# Patient Record
Sex: FEMALE | Race: WHITE
[De-identification: names, ages, dates, MRNs, and addresses within clinical notes are randomized per-mention and may not be internally consistent; named-entity substitution may affect disease eponyms.]

---

## 2019-01-24 ENCOUNTER — HOSPITAL ENCOUNTER (INPATIENT)
Dept: HOSPITAL 41 - JD.OBCHECK | Age: 24
LOS: 2 days | Discharge: HOME | DRG: 560 | End: 2019-01-26
Attending: OBSTETRICS & GYNECOLOGY | Admitting: OBSTETRICS & GYNECOLOGY
Payer: COMMERCIAL

## 2019-01-24 DIAGNOSIS — Z3A.37: ICD-10-CM

## 2019-01-24 DIAGNOSIS — E03.9: ICD-10-CM

## 2019-01-24 DIAGNOSIS — A60.00: ICD-10-CM

## 2019-01-24 DIAGNOSIS — Z23: ICD-10-CM

## 2019-01-24 PROCEDURE — 6A550ZT PHERESIS OF CORD BLOOD STEM CELLS, SINGLE: ICD-10-PCS | Performed by: OBSTETRICS & GYNECOLOGY

## 2019-01-24 PROCEDURE — 3E0R3BZ INTRODUCTION OF ANESTHETIC AGENT INTO SPINAL CANAL, PERCUTANEOUS APPROACH: ICD-10-PCS

## 2019-01-24 PROCEDURE — 00HU33Z INSERTION OF INFUSION DEVICE INTO SPINAL CANAL, PERCUTANEOUS APPROACH: ICD-10-PCS

## 2019-01-24 NOTE — PCM.PREANE
Preanesthetic Assessment





- Procedure


Proposed Procedure: 





HANS





- Anesthesia/Transfusion/Family Hx


Anesthesia History: Prior Anesthesia Without Reaction


Family History of Anesthesia Reaction: No


Transfusion History: No Prior Transfusion(s)





- Review of Systems


General: No Symptoms


Pulmonary: No Symptoms


Cardiovascular: No Symptoms


Gastrointestinal: No Symptoms


Neurological: No Symptoms


Other: Reports: None





- Physical Assessment


NPO Status Date: 01/24/19


NPO Status Time: 18:00


O2 Sat by Pulse Oximetry: 98


Respiratory Rate: 18


Vital Signs: 





 Last Vital Signs











Temp  37.6 C   01/24/19 20:11


 


Pulse  96   01/24/19 20:11


 


Resp  18   01/24/19 20:11


 


BP  127/71   01/24/19 20:11


 


Pulse Ox  98   01/24/19 20:11











Height: 1.73 m


Weight: 85.729 kg


ASA Class: 2


Mental Status: Alert & Oriented x3


Airway Class: Mallampati = 1


Dentition: Reports: Normal Dentition


Thyro-Mental Finger Breadths: 3


Mouth Opening Finger Breadths: 3


ROM/Head Extension: Full


Lungs: Clear to Auscultation, Normal Respiratory Effort


Cardiovascular: Regular Rate, Regular Rhythm





- Lab


Values: 





 Laboratory Last Values











WBC  19.85 K/mm3 (3.98-10.04)  H  01/24/19  20:25    


 


RBC  3.91 M/mm3 (3.98-5.22)  L  01/24/19  20:25    


 


Hgb  11.5 gm/L (11.2-15.7)   01/24/19  20:25    


 


Hct  35.7 % (34.1-44.9)   01/24/19  20:25    


 


MCV  91.3 fl (79.4-94.8)   01/24/19  20:25    


 


MCH  29.4 pg (25.6-32.2)   01/24/19  20:25    


 


MCHC  32.2 g/dl (32.2-35.5)   01/24/19  20:25    


 


RDW Std Deviation  41.6 fL (36.4-46.3)   01/24/19  20:25    


 


Plt Count  203 K/mm3 (182-369)   01/24/19  20:25    


 


MPV  12.6 fl (9.4-12.3)  H  01/24/19  20:25    














- Allergies


Allergies/Adverse Reactions: 


 Allergies











Allergy/AdvReac Type Severity Reaction Status Date / Time


 


No Known Allergies Allergy   Verified 01/24/19 20:37














- Blood


Blood Available: No


Product(s) Available: None





- Anesthesia Plan


Pre-Op Medication Ordered: None





- Acknowledgements


Anesthesia Type Planned: Epidural


Pt an Appropriate Candidate for the Planned Anesthesia: Yes


Alternatives and Risks of Anesthesia Discussed w Pt/Guardian: Yes


Pt/Guardian Understands and Agrees with Anesthesia Plan: Yes





PreAnesthesia Questionnaire


OB/GYN History: Reports: Pregnancy


Endocrine/Metabolic History: Reports: Hypothyroidism





- Infectious Disease History


Infectious Disease History: Reports: Herpes


Other Infectious Disease History: no active outbreak in 3 years





- Past Surgical History


HEENT Surgical History: Reports: Oral Surgery ([place] tooth extraction)





- SUBSTANCE USE


Smoking Status *Q: Never Smoker


Second Hand Smoke Exposure: No


Recreational Drug Use History: No





- HOME MEDS


Home Medications: 


 Home Meds





Acyclovir 400 mg PO TID 01/24/19 [History]


PNV95/Ferrous Fumarate/FA [Prenatal Tablet] 1 tab PO DAILY 01/24/19 [History]











- CURRENT (IN HOUSE) MEDS


Current Meds: 





 Current Medications





Diphenhydramine HCl (Benadryl)  25 mg IVPUSH Q6H PRN


   PRN Reason: Pruritis


Ephedrine Sulfate (Ephedrine Sulfate)  5 mg IVPUSH ASDIRECTED PRN


   PRN Reason: Hypotension


Fentanyl (Sublimaze)  100 mcg EPIDUR Q3H PRN


   PRN Reason: Pain


   Last Admin: 01/24/19 21:12 Dose:  100 mcg


Fentanyl/Bupivacaine HCl (Fentanyl-Bupiv-Ns 2 Mcg/Ml-0.125%)  100 ml EP 

ASDIRECTED [facility]


   Last Admin: 01/24/19 21:13 Dose:  100 ml


Lactated Ringer's (Ringers, Lactated)  1,000 mls @ 100 mls/hr IV ASDIRECTED [facility]


   Last Admin: 01/24/19 20:49 Dose:  100 mls/hr


Oxytocin/Lactated Ringer's (Pitocin In Lr 10 Units/1,000 Ml)  10 unit in 1,000 

mls @ 500 mls/hr IV .CONTINUOUS BHUPINDER


Nalbuphine HCl (Nubain)  10 mg IVPUSH Q2H PRN


   PRN Reason: pain


Ondansetron HCl (Zofran)  4 mg IVPUSH Q4H PRN


   PRN Reason: Nausea/Vomiting


Ondansetron HCl (Zofran)  4 mg IVPUSH ONETIME PRN


   PRN Reason: Nausea/Vomiting


Sodium Chloride (Saline Flush)  10 ml FLUSH ASDIRECTED PRN


   PRN Reason: Keep Vein Open

## 2019-01-24 NOTE — PCM.LDHP
L&D History of Present Illness





- General


Date of Service: 19


Admit Problem/Dx: 


 Patient Status Order with Admit Dx/Problem





19 19:59


Patient Status [ADT] Routine 








 Admission Diagnosis/Problem











Admission Diagnosis/Problem    Normal labor














Source of Information: Patient


History Limitations: Reports: No Limitations





- History of Present Illness


Introduction:: 


24 y/o  at 37 4/7 wks who presents in labor.  Contractions started today.  

They have slowly gotten closer together.  No vaginal bleeding.  No LOF








Past Medical History


OB/GYN History: Reports: Pregnancy


: 1


Para: 0


LMP (Approximate): Pregnant


Endocrine/Metabolic History: Reports: Hypothyroidism





- Infectious Disease History


Infectious Disease History: Reports: Herpes





- Past Surgical History


HEENT Surgical History: Reports: Oral Surgery ([place] tooth extraction)





Social & Family History





- Tobacco Use


Smoking Status *Q: Never Smoker





- Alcohol Use


Alcohol Use History: No





- Recreational Drug Use


Recreational Drug Use: No





H&P Review of Systems





- Review of Systems:


Review Of Systems: See Below


General: Reports: No Symptoms


Pulmonary: Reports: No Symptoms


Cardiovascular: Reports: No Symptoms


Gastrointestinal: Reports: No Symptoms


Genitourinary: Reports: No Symptoms


Musculoskeletal: Reports: No Symptoms


Psychiatric: Reports: No Symptoms


Neurological: Reports: No Symptoms





L&D Exam





- Exam


Exam: See Below





- OB Specific


Contraction Intensity: Moderate to Strong


Fetal Movement: Active


Fetal Heart Tones: Present


Fetal Heart Tones per Min: 170


Fetal Heart Rate (FHR) Variability: Moderate (6-25 bmp)


Birth Presentation: Vertex





- Leavitt Score


Leavitt Score Cervix Position: Anterior


Leavitt Score Consistency: Soft


Leavitt Score Effacement: >80%


Leavitt Score Dilation: > 5 cm


Leavitt Score Infant's Station: -1 ,0


Leavitt Score Total: 12





- Exam


General: Alert, Oriented, Cooperative


Lungs: Clear to Auscultation, Normal Respiratory Effort


Cardiovascular: Regular Rate, Regular Rhythm


GI/Abdominal Exam: Soft, Non-Tender


Genitourinary: Normal external exam


Extremities: Normal Inspection


Skin: Warm, Dry, Intact





- Problem List


(1) 37 weeks gestation of pregnancy


SNOMED Code(s): 23310456


   ICD Code: Z3A.37 - 37 WEEKS GESTATION OF PREGNANCY   Status: Acute   Current 

Visit: Yes   





(2) Normal labor


SNOMED Code(s): 26727661


   ICD Code: O80 - ENCOUNTER FOR FULL-TERM UNCOMPLICATED DELIVERY; Z37.9 - 

OUTCOME OF DELIVERY, UNSPECIFIED   Status: Acute   Current Visit: Yes   


Problem List Initiated/Reviewed/Updated: Yes


Orders Last 24hrs: 


 Active Orders 24 hr











 Category Date Time Status


 


 Patient Status [ADT] Routine ADT  19 19:59 Active


 


 Activity as Tolerated [RC] PFP Care  19 19:59 Active


 


 Communication Order [RC] ASDIRECTED Care  19 19:59 Active


 


 Fetal Heart Tones [RC] ASDIRECTED Care  19 19:59 Active


 


 Fetal Non Stress Test [RC] PER UNIT ROUTINE Care  19 19:59 Active


 


 Notify Provider [RC] PFP Care  19 19:59 Active


 


 Notify Provider [RC] PRN Care  19 19:59 Active


 


 Peripheral IV Care [RC] .AS DIRECTED Care  19 19:59 Active


 


 Vital Signs [RC] PER UNIT ROUTINE Care  19 19:59 Active


 


 Regular Diet [DIET] Diet  19 Dinner Active


 


 CBC W/O DIFF,HEMOGRAM [HEME] Stat Lab  19 19:59 Ordered


 


 RAPID PLASMA REAGIN,RPR [CHEM] Routine Lab  19 19:59 Ordered


 


 TYPE AND SCREEN [BBK] Stat Lab  19 19:59 Ordered


 


 Lactated Ringers [Ringers, Lactated] 1,000 ml Med  19 20:00 Ordered





 IV ASDIRECTED   


 


 Nalbuphine [Nubain] Med  19 19:59 Ordered





 10 mg IVPUSH Q2H PRN   


 


 Ondansetron [Zofran] Med  19 19:59 Ordered





 4 mg IVPUSH Q4H PRN   


 


 Oxytocin/Lactated Ringers [Pitocin in LR 10 Units/1,000 Med  19 20:00 

Ordered





 ML]   





 10 unit in 1,000 ml IV .CONTINUOUS   


 


 Sodium Chloride 0.9% [Saline Flush] Med  19 19:59 Ordered





 10 ml FLUSH ASDIRECTED PRN   


 


 Electronic Fetal Heart Tones Ext w TOCO [WOMSER] Oth  19 19:59 Ordered





 Routine   


 


 Electronic Fetal Heart Tones Internal [WOMSER] Per Unit Oth  19 19:59 

Ordered





 Routine   


 


 Peripheral IV Insertion Adult [OM.PC] Routine Oth  19 19:59 Ordered


 


 Resuscitation Status Routine Resus Stat  19 19:59 Ordered








 Medication Orders





Lactated Ringer's (Ringers, Lactated)  1,000 mls @ 100 mls/hr IV ASDIRECTED [facility]ER


Oxytocin/Lactated Ringer's (Pitocin In Lr 10 Units/1,000 Ml)  10 unit in 1,000 

mls @ 500 mls/hr IV .CONTINUOUS BHUPINDER


Nalbuphine HCl (Nubain)  10 mg IVPUSH Q2H PRN


   PRN Reason: pain


Ondansetron HCl (Zofran)  4 mg IVPUSH Q4H PRN


   PRN Reason: Nausea/Vomiting


Sodium Chloride (Saline Flush)  10 ml FLUSH ASDIRECTED PRN


   PRN Reason: Keep Vein Open








Assessment/Plan Comment:: 





24 y/o  at 37 4/7 wks who presents in labor





* Labs to be drawn 


* GBS negative, no need for antibiotics 


* Pain management per patient preference


* Patient denies any symptoms of HSV outbreak.  Has been on her prophylaxis 


* Anticipate

## 2019-01-24 NOTE — PCM.DEL
L & D Note





- General Info


Date of Service: 19





- Delivery Note


Labor: Spontaneous


Delivery Outcome: Livebirth


Infant Delivery Method: Spontaneous Vaginal Delivery-Single


Infant Delivery Mode: Spontaneous


Birth Presentation: Right Occiput Anterior (OPAL)


Nuchal Cord: Present (tight, not reduced)


Anesthesia Type: Epidural


Amniotic Fluid Description: Clear


Episiotomy Type: None


Laceration: None


Placenta: Intact, Spontaneous


Cord: 3 Vessels


Estimated Blood Loss: 200


: Bulb Syringe, Stimulated, Warmed, [place] Used, Warmer Used


Delivery Comments (Free Text/Narrative):: 





Patient found to be complete and began pushing.  With maternal pushing effort 

fetal head delivered from an OPAL presentation.  Tight nuchal cord present and 

so not reduced.  With gentle downward traction the fetal shoulders and body 

delivered.  Infant placed on maternal abdomen.  Cord clamped and cut.  Cord 

blood obtained. Placenta allowed time to separate and expelled intact.  

Inspection of the perineum showed no lacerations 





- General Info


Date of Service: 19





- Patient Data


Vitals - Most Recent: 


 Last Vital Signs











Temp  37.6 C   19 20:11


 


Pulse  96   19 20:11


 


Resp  18   19 21:26


 


BP  127/71   19 20:11


 


Pulse Ox  98   19 21:26











Weight - Most Recent: 85.729 kg


Lab Results Last 24 Hours: 


 Laboratory Results - last 24 hr











  19 Range/Units





  20:25 20:25 


 


WBC  19.85 H   (3.98-10.04)  K/mm3


 


RBC  3.91 L   (3.98-5.22)  M/mm3


 


Hgb  11.5   (11.2-15.7)  gm/L


 


Hct  35.7   (34.1-44.9)  %


 


MCV  91.3   (79.4-94.8)  fl


 


MCH  29.4   (25.6-32.2)  pg


 


MCHC  32.2   (32.2-35.5)  g/dl


 


RDW Std Deviation  41.6   (36.4-46.3)  fL


 


Plt Count  203   (182-369)  K/mm3


 


MPV  12.6 H   (9.4-12.3)  fl


 


Blood Type   B POSITIVE  


 


Gel Antibody Screen   Negative  











Med Orders - Current: 


 Current Medications





Diphenhydramine HCl (Benadryl)  25 mg IVPUSH Q6H PRN


   PRN Reason: Pruritis


Ephedrine Sulfate (Ephedrine Sulfate)  5 mg IVPUSH ASDIRECTED PRN


   PRN Reason: Hypotension


Fentanyl (Sublimaze)  100 mcg EPIDUR Q3H PRN


   PRN Reason: Pain


   Last Admin: 19 21:12 Dose:  100 mcg


Fentanyl/Bupivacaine HCl (Fentanyl-Bupiv-Ns 2 Mcg/Ml-0.125%)  100 ml EP 

ASDIRECTED [facility]


   Last Admin: 19 21:13 Dose:  100 ml


Lactated Ringer's (Ringers, Lactated)  1,000 mls @ 100 mls/hr IV ASDIRECTED [facility]


   Last Admin: 19 20:49 Dose:  100 mls/hr


Oxytocin/Lactated Ringer's (Pitocin In Lr 10 Units/1,000 Ml)  10 unit in 1,000 

mls @ 500 mls/hr IV .CONTINUOUS BHUPINDER


Nalbuphine HCl (Nubain)  10 mg IVPUSH Q2H PRN


   PRN Reason: pain


Ondansetron HCl (Zofran)  4 mg IVPUSH Q4H PRN


   PRN Reason: Nausea/Vomiting


Ondansetron HCl (Zofran)  4 mg IVPUSH ONETIME PRN


   PRN Reason: Nausea/Vomiting


Sodium Chloride (Saline Flush)  10 ml FLUSH ASDIRECTED PRN


   PRN Reason: Keep Vein Open











- Problem List & Annotations


(1) 37 weeks gestation of pregnancy


SNOMED Code(s): 39932002


   Code(s): Z3A.37 - 37 WEEKS GESTATION OF PREGNANCY   Status: Acute   Current 

Visit: Yes   





(2) Normal labor


SNOMED Code(s): 20959975


   Code(s): O80 - ENCOUNTER FOR FULL-TERM UNCOMPLICATED DELIVERY; Z37.9 - 

OUTCOME OF DELIVERY, UNSPECIFIED   Status: Acute   Current Visit: Yes   





(3) Vaginal delivery


SNOMED Code(s): 493592822


   Code(s): O80 - ENCOUNTER FOR FULL-TERM UNCOMPLICATED DELIVERY   Status: 

Acute   Current Visit: Yes   





- Problem List Review


Problem List Initiated/Reviewed/Updated: Yes





- My Orders


Last 24 Hours: 


My Active Orders





19 19:59


Patient Status [ADT] Routine 


Activity as Tolerated [RC] PFP 


Communication Order [RC] ASDIRECTED 


Fetal Heart Tones [RC] ASDIRECTED 


Fetal Non Stress Test [RC] PER UNIT ROUTINE 


Notify Provider [RC] PFP 


Notify Provider [RC] PRN 


Peripheral IV Care [RC] .AS DIRECTED 


Vital Signs [RC] PER UNIT ROUTINE 


RAPID PLASMA REAGIN,RPR [CHEM] Routine 


Nalbuphine [Nubain]   10 mg IVPUSH Q2H PRN 


Ondansetron [Zofran]   4 mg IVPUSH Q4H PRN 


Sodium Chloride 0.9% [Saline Flush]   10 ml FLUSH ASDIRECTED PRN 


Electronic Fetal Heart Tones Ext w TOCO [WOMSER] Routine 


Electronic Fetal Heart Tones Internal [WOMSER] Per Unit Routine 


Peripheral IV Insertion Adult [OM.PC] Routine 


Resuscitation Status Routine 





19 20:00


Lactated Ringers [Ringers, Lactated] 1,000 ml IV ASDIRECTED 


Oxytocin/Lactated Ringers [Pitocin in LR 10 Units/1,000 ML] 10 unit in 1,000 ml 

IV .CONTINUOUS 





19 22:20


PATIENT RETYPE [BBK] Routine 





19 Dinner


Regular Diet [DIET] 














- Assessment


Assessment:: 





24 y/o G1 P now  PPD#0 from  at 37 4/7 wks 








- Plan


Plan:: 








* Routine cares 


* Encourage breast feeding 


* Discharge home in 2 days

## 2019-01-25 NOTE — PCM.PNPP
- General Info


Date of Service: 19


Functional Status: Reports: Pain Controlled, Tolerating Diet, Ambulating, 

Urinating





- Review of Systems


General: Reports: No Symptoms


Pulmonary: Reports: No Symptoms


Cardiovascular: Reports: No Symptoms


Gastrointestinal: Reports: No Symptoms


Genitourinary: Reports: No Symptoms


Musculoskeletal: Reports: No Symptoms


Neurological: Reports: No Symptoms





- Patient Data


Vital Signs - Most Recent: 


 Last Vital Signs











Temp  37.4 C   19 04:05


 


Pulse  99   19 04:05


 


Resp  1 L  19 04:05


 


BP  120/68   19 04:05


 


Pulse Ox  100   19 04:05











Weight - Most Recent: 85.729 kg


I&O - Last 24 Hours: 


 Intake & Output











 19





 22:59 06:59 14:59


 


Intake Total  3000 


 


Output Total 225  


 


Balance -225 3000 











Lab Results - Last 24 Hours: 


 Laboratory Results - last 24 hr











  19 Range/Units





  20:25 20:25 


 


WBC  19.85 H   (3.98-10.04)  K/mm3


 


RBC  3.91 L   (3.98-5.22)  M/mm3


 


Hgb  11.5   (11.2-15.7)  gm/L


 


Hct  35.7   (34.1-44.9)  %


 


MCV  91.3   (79.4-94.8)  fl


 


MCH  29.4   (25.6-32.2)  pg


 


MCHC  32.2   (32.2-35.5)  g/dl


 


RDW Std Deviation  41.6   (36.4-46.3)  fL


 


Plt Count  203   (182-369)  K/mm3


 


MPV  12.6 H   (9.4-12.3)  fl


 


Blood Type   B POSITIVE  


 


Gel Antibody Screen   Negative  











Med Orders - Current: 


 Current Medications





Acetaminophen (Tylenol)  650 mg PO Q4H PRN


   PRN Reason: mild pain or fever


Benzocaine/Menthol (Dermoplast Pain Relief Spray)  0 gm TOP ASDIRECTED PRN


   PRN Reason: Perineal Comfort Measure


   Last Admin: 19 06:14 Dose:  1 canister


Docusate Sodium (Colace)  100 mg PO BID PRN


   PRN Reason: Constipation


Emollient Ointment (Lansinoh Hpa)  0 gm TOP ASDIRECTED PRN


   PRN Reason: Sore Nipples


Ibuprofen (Motrin)  600 mg PO Q6H PRN


   PRN Reason: Mild pain or fever


   Last Admin: 19 06:15 Dose:  600 mg


Measles/Mumps/Rubella Vaccine Live (M-M-R Ii Vaccine)  0.5 ml SUBCUT .ONCE ONE


   Stop: 19 10:01


Witch Hazel (Tucks)  1 pad TOP ASDIRECTED PRN


   PRN Reason: Hemorrhoid pain





Discontinued Medications





Diphenhydramine HCl (Benadryl)  25 mg IVPUSH Q6H PRN


   PRN Reason: Pruritis


Ephedrine Sulfate (Ephedrine Sulfate)  5 mg IVPUSH ASDIRECTED PRN


   PRN Reason: Hypotension


Fentanyl (Sublimaze)  100 mcg EPIDUR Q3H PRN


   PRN Reason: Pain


   Last Admin: 19 21:12 Dose:  100 mcg


Fentanyl/Bupivacaine HCl (Fentanyl-Bupiv-Ns 2 Mcg/Ml-0.125%)  100 ml EP 

ASDIRECTED [facility]


   Last Admin: 19 21:13 Dose:  100 ml


Lactated Ringer's (Ringers, Lactated)  1,000 mls @ 100 mls/hr IV ASDIRECTED [facility]


   Last Admin: 19 20:49 Dose:  100 mls/hr


Oxytocin/Lactated Ringer's (Pitocin In Lr 10 Units/1,000 Ml)  10 unit in 1,000 

mls @ 500 mls/hr IV .CONTINUOUS BHUPINDER


Nalbuphine HCl (Nubain)  10 mg IVPUSH Q2H PRN


   PRN Reason: pain


Ondansetron HCl (Zofran)  4 mg IVPUSH Q4H PRN


   PRN Reason: Nausea/Vomiting


Ondansetron HCl (Zofran)  4 mg IVPUSH ONETIME PRN


   PRN Reason: Nausea/Vomiting


Sodium Chloride (Saline Flush)  10 ml FLUSH ASDIRECTED PRN


   PRN Reason: Keep Vein Open











- Infant Interaction


Infant Disposition, Postpartum: [place] in Room with Family


Infant Interaction: Holding Infant


Infant Feeding: Attempted Breastfeeding; Nursed Fair/Poor


Support Person: Significant Other





- Postpartum Recovery Exam


Fundal Tone: Firm


Fundal Level: 1 Fingerbreadths Below Umbilicus


Fundal Placement: Midline


Lochia Amount: Small


Lochia Color: Rubra/Red


Perineum Description: Intact, Minimal Bruising/Swelling


Episiotomy/Laceration: None


Bladder Status: Voiding





- Exam


General: Alert, Oriented, Cooperative


GI/Abdominal Exam: Soft, Non-Tender


Extremities: Normal Inspection


Skin: Warm, Dry, Intact





- Problem List & Annotations


(1) 37 weeks gestation of pregnancy


SNOMED Code(s): 12060599


   Code(s): Z3A.37 - 37 WEEKS GESTATION OF PREGNANCY   Status: Acute   Current 

Visit: Yes   





(2) Normal labor


SNOMED Code(s): 99694842


   Code(s): O80 - ENCOUNTER FOR FULL-TERM UNCOMPLICATED DELIVERY; Z37.9 - 

OUTCOME OF DELIVERY, UNSPECIFIED   Status: Acute   Current Visit: Yes   





(3) Vaginal delivery


SNOMED Code(s): 727294789


   Code(s): O80 - ENCOUNTER FOR FULL-TERM UNCOMPLICATED DELIVERY   Status: 

Acute   Current Visit: Yes   





- Problem List Review


Problem List Initiated/Reviewed/Updated: Yes





- My Orders


Last 24 Hours: 


My Active Orders





19 19:59


Fetal Heart Tones [RC] ASDIRECTED 


Vital Signs [RC] PER UNIT ROUTINE 


Resuscitation Status Routine 





19 22:20


PATIENT RETYPE [BBK] Routine 





19 22:49


Activity as Tolerated [RC] PER UNIT ROUTINE 


Vital Signs [RC] 09,15,21,03 


Acetaminophen [Tylenol]   650 mg PO Q4H PRN 


Benzocaine/Menthol [Dermoplast Pain Relief Spray]   See Dose Instructions  TOP 

ASDIRECTED PRN 


Docusate Sodium [Colace]   100 mg PO BID PRN 


Ibuprofen [Motrin]   600 mg PO Q6H PRN 


Lanolin [Lansinoh HPA]   See Dose Instructions  TOP ASDIRECTED PRN 


Witch Hazel [Tucks]   1 pad TOP ASDIRECTED PRN 


Assess Lochia [WOMSER] Per Unit Routine 


Assess Uterine Involution [WOMSER] Per Unit Routine 


Breast Pump [WOMSER] Per Unit Routine 


Heat Therapy [OM.PC] PRN 


Ice Therapy [OM.PC] Per Unit Routine 


Perineal Care [OM.PC] Per Unit Routine 


Peripheral IV Discontinue [OM.PC] Routine 


Sitz Bath [OM.PC] Per Unit Routine 





19 22:50


Vaccines to be Administered [RC] PER UNIT ROUTINE 





19 Dinner


Regular Diet [DIET] 





19 22:49


Heat Therapy [OM.PC] PRN 





19 10:00


Measles, Mumps & Rubella [M-M-R II Vaccine]   0.5 ml SUBCUT .ONCE ONE 














- Assessment


Assessment:: 





24 y/o G1 P now  PPD#1 from  at 37 4/7 wks 








- Plan


Plan:: 








* Routine cares 


* Encourage breast feeding 


* Discharge home tomorrow

## 2019-01-25 NOTE — [id number]
Post Anesthesia Note





- EVALUATION WITHIN 48HRS OF ANESTHETIC


Vital Signs in Normal Range: Yes


Patient Participated in Evaluation: Yes


Respiratory Function Stable: Yes


Airway Patent: Yes


Cardiovascular Function Stable: Yes


Hydration Status Stable: Yes


Pain Control Satisfactory: Yes


Nausea and Vomiting Control Satisfactory: Yes


Mental Status Recovered: Yes


Pulse Rate: 99


Resp Rate: 1


Temperature: 99.3 F


Blood Pressure: 120/68

## 2019-01-26 PROCEDURE — 3E0234Z INTRODUCTION OF SERUM, TOXOID AND VACCINE INTO MUSCLE, PERCUTANEOUS APPROACH: ICD-10-PCS | Performed by: OBSTETRICS & GYNECOLOGY

## 2019-01-26 NOTE — PCM.DCSUM1
**Discharge Summary





- Hospital Course


Free Text/Narrative:: 








22 y/o  at 37 4/7 wks who presents in labor.  Contractions started today.  

They have slowly gotten closer together.  No vaginal bleeding.  No LOF





Patient had a spontaneous vaginal delivery on 2019. Alert and right 

occiput anterior position. A stab blood loss 200 mL. Three-vessel cord. Tight 

nuchal cord presented and was not able to be reduced. Mom and baby did well.














L & D Note





- General Info


Date of Service: 19





- Delivery Note


Labor: Spontaneous


Delivery Outcome: Livebirth


Infant Delivery Method: Spontaneous Vaginal Delivery-Single


Infant Delivery Mode: Spontaneous


Birth Presentation: Right Occiput Anterior (OPAL)


Nuchal Cord: Present (tight, not reduced)


Anesthesia Type: Epidural


Amniotic Fluid Description: Clear


Episiotomy Type: None


Laceration: None


Placenta: Intact, Spontaneous


Cord: 3 Vessels


Estimated Blood Loss: 200


: Bulb Syringe, Stimulated, Warmed, [place] Used, Warmer Used


Delivery Comments (Free Text/Narrative):: 





Patient found to be complete and began pushing.  With maternal pushing effort 

fetal head delivered from an OPAL presentation.  Tight nuchal cord present and 

so not reduced.  With gentle downward traction the fetal shoulders and body 

delivered.  Infant placed on maternal abdomen.  Cord clamped and cut.  Cord 

blood obtained. Placenta allowed time to separate and expelled intact.  

Inspection of the perineum showed no lacerations 





Diagnosis: Stroke: No





- Discharge Data


Discharge Date: 19


Discharge Disposition: Home, Self-Care 01


Condition: Good





- Patient Summary/Data


Hospital Course: 





Unremarkable postpartum course. Vital signs stable. Patient is afebrile. 

Minimal lochia. Voiding well. Ambulating without concerns.





- Patient Instructions


Diet: Regular Diet as Tolerated


Activity: As Tolerated (No intercourse or tampons until bleeding resolves)


Driving: Do Not Drive (2 days)


Notify Provider of: Fever, Increased Pain, Swelling and Redness, Nausea and/or 

Vomiting





- Discharge Plan


Home Medications: 


 Home Meds





Acyclovir 400 mg PO TID 19 [History]


PNV95/Ferrous Fumarate/FA [Prenatal Tablet] 1 tab PO DAILY 19 [History]


Acetaminophen [Tylenol] 650 mg PO Q4H PRN  tablet 19 [Rx]


Ibuprofen [Motrin] 600 mg PO Q6H PRN  tablet 19 [Rx]








Referrals: 


Heather Chung MD [Primary Care Provider] -  (Return to clinicDr. Chung4-

6 weeks.)





- Discharge Summary/Plan Comment


DC Time >30 min.: No


Discharge Summary/Plan Comment: 








Discharge instructions:





1. Discharge home





2. Diet, activity and follow-up discussed with patient. Recommend nursing diet 

with increased calories and calcium.





3. Precautions given concern increased pain, bleeding, temperature, signs/

symptoms of DVT/PE.





4. Medications per home medication was printed, discussed with and given to the 

patient.





5. [facility]





Diagnosis: Term pregnancy-delivered 





Condition: Good





- Patient Data


Vitals - Most Recent: 


 Last Vital Signs











Temp  36.7 C   19 04:52


 


Pulse  79   19 04:52


 


Resp  15   19 04:52


 


BP  112/81   19 04:52


 


Pulse Ox  99   19 04:52











Weight - Most Recent: 85.729 kg


Med Orders - Current: 


 Current Medications





Acetaminophen (Tylenol)  650 mg PO Q4H PRN


   PRN Reason: mild pain or fever


Benzocaine/Menthol (Dermoplast Pain Relief Spray)  0 gm TOP ASDIRECTED PRN


   PRN Reason: Perineal Comfort Measure


   Last Admin: 19 06:14 Dose:  1 canister


Docusate Sodium (Colace)  100 mg PO BID PRN


   PRN Reason: Constipation


Emollient Ointment (Lansinoh Hpa)  0 gm TOP ASDIRECTED PRN


   PRN Reason: Sore Nipples


   Last Admin: 19 04:48 Dose:  1 tube


Ibuprofen (Motrin)  600 mg PO Q6H PRN


   PRN Reason: Mild pain or fever


   Last Admin: 19 04:25 Dose:  600 mg


Measles/Mumps/Rubella Vaccine Live (M-M-R Ii Vaccine)  0.5 ml SUBCUT .ONCE ONE


   Stop: 19 10:01


Witch Hazel (Tucks)  1 pad TOP ASDIRECTED PRN


   PRN Reason: Hemorrhoid pain





Discontinued Medications





Diphenhydramine HCl (Benadryl)  25 mg IVPUSH Q6H PRN


   PRN Reason: Pruritis


Ephedrine Sulfate (Ephedrine Sulfate)  5 mg IVPUSH ASDIRECTED PRN


   PRN Reason: Hypotension


Fentanyl (Sublimaze)  100 mcg EPIDUR Q3H PRN


   PRN Reason: Pain


   Last Admin: 19 21:12 Dose:  100 mcg


Fentanyl/Bupivacaine HCl (Fentanyl-Bupiv-Ns 2 Mcg/Ml-0.125%)  100 ml EP 

ASDIRECTED [facility]


   Last Admin: 19 21:13 Dose:  100 ml


Lactated Ringer's (Ringers, Lactated)  1,000 mls @ 100 mls/hr IV ASDIRECTED [facility]


   Last Admin: 19 20:49 Dose:  100 mls/hr


Oxytocin/Lactated Ringer's (Pitocin In Lr 10 Units/1,000 Ml)  10 unit in 1,000 

mls @ 500 mls/hr IV .CONTINUOUS [facility]


Nalbuphine HCl (Nubain)  10 mg IVPUSH Q2H PRN


   PRN Reason: pain


Ondansetron HCl (Zofran)  4 mg IVPUSH Q4H PRN


   PRN Reason: Nausea/Vomiting


Ondansetron HCl (Zofran)  4 mg IVPUSH ONETIME PRN


   PRN Reason: Nausea/Vomiting


Sodium Chloride (Saline Flush)  10 ml FLUSH ASDIRECTED PRN


   PRN Reason: Keep Vein Open

## 2019-01-27 ENCOUNTER — HOSPITAL ENCOUNTER (EMERGENCY)
Dept: HOSPITAL 41 - JD.ED | Age: 24
Discharge: HOME | End: 2019-01-27
Payer: COMMERCIAL

## 2019-01-27 DIAGNOSIS — E03.9: ICD-10-CM

## 2019-01-27 NOTE — EDM.PDOC
ED HPI GENERAL MEDICAL PROBLEM





- General


Chief Complaint: Fever


Stated Complaint: 3 DAYS POSTPARTUM FEVER


Time Seen by Provider: 19 07:43


Source of Information: Reports: Patient


History Limitations: Reports: No Limitations





- History of Present Illness


INITIAL COMMENTS - FREE TEXT/NARRATIVE: 





The patient is a 23-year-old previously healthy  female who is 3 days 

postpartum from an uncomplicated vaginal delivery. She had no infectious 

complications with this pregnancy that she is aware of. She was taking 

suppressive acyclovir dosing, but had not had an outbreak of herpes for 3 

years. She felt hot last night and had a temperature of 101.8. The fever was 

persistent for a few hours so she took a dose of Tylenol and the temperature 

came down. She otherwise feels well. She has no specific complaint. She doesn't 

feel sick. No sore throat, congestion, or cough. No chest pain or shortness of 

breath. Denies abdominal pain. No vomiting or diarrhea. She has mild burning 

sensation with urination, "like salt on a wound" but doesn't have pain that 

feels like a urinary tract infection to her. No hematuria. She's been having 

some bloody vaginal discharge. No skin complaint.





- Related Data


 Allergies











Allergy/AdvReac Type Severity Reaction Status Date / Time


 


No Known Allergies Allergy   Verified 19 20:37











Home Meds: 


 Home Meds





Acyclovir 400 mg PO TID 19 [History]


PNV95/Ferrous Fumarate/FA [Prenatal Tablet] 1 tab PO DAILY 19 [History]


Acetaminophen [Tylenol] 650 mg PO Q4H PRN  tablet 19 [Rx]


Ibuprofen [Motrin] 600 mg PO Q6H PRN  tablet 19 [Rx]











Past Medical History


OB/GYN History: Reports: Pregnancy


Endocrine/Metabolic History: Reports: Hypothyroidism





- Infectious Disease History


Infectious Disease History: Reports: Herpes


Other Infectious Disease History: no active outbreak in 3 years





- Past Surgical History


HEENT Surgical History: Reports: Oral Surgery





Social & Family History





- Family History


Family Medical History: Noncontributory





- Tobacco Use


Smoking Status *Q: Never Smoker


Second Hand Smoke Exposure: No





- Caffeine Use


Caffeine Use: Reports: None





- Recreational Drug Use


Recreational Drug Use: No





ED ROS GENERAL





- Review of Systems


Review Of Systems: See Below


Constitutional: Reports: Fever


HEENT: Denies: Rhinitis, Throat Pain, Throat Swelling


Respiratory: Denies: Shortness of Breath, Cough


Cardiovascular: Denies: Chest Pain


Endocrine: Reports: No Symptoms


GI/Abdominal: Denies: Abdominal Pain


: Denies: Flank Pain, Hematuria


Musculoskeletal: Reports: No Symptoms


Skin: Reports: No Symptoms.  Denies: Rash, Wound


Neurological: Reports: No Symptoms


Psychiatric: Reports: No Symptoms


Hematologic/Lymphatic: Reports: No Symptoms


Immunologic: Reports: No Symptoms





ED EXAM, SEPSIS





- Physical Exam


Exam: See Below


Exam Limited By: No Limitations


General Appearance: Alert, WD/WN, No Apparent Distress


Eye Exam: Bilateral Eye: Normal Inspection


Ears: Normal External Exam


Nose: Normal Inspection


Throat/Mouth: Normal Inspection, Normal Oropharynx, Normal Voice, No Airway 

Compromise


Head: Atraumatic, Normocephalic


Neck: Normal Inspection, Supple


Respiratory/Chest: No Respiratory Distress, Lungs Clear, Normal Breath Sounds, 

No Accessory Muscle Use, Chest Non-Tender


Cardiovascular: Normal Peripheral Pulses, Regular Rate, Rhythm, No Murmur


GI/Abdominal Exam: Soft, Non-Tender, No Distention


Back: Normal Inspection


Extremities: Normal Inspection, Non-Tender, No Pedal Edema


Neurological: Alert, Oriented, Normal Cognition, No Motor/Sensory Deficits


Psychiatric: Normal Affect, Normal Mood


Skin: Warm, Dry, Intact, Normal Color, No Rash





Course





- Vital Signs


Last Recorded V/S: 


 Last Vital Signs











Temp  36.9 C   19 07:34


 


Pulse  105 H  19 07:34


 


Resp  16   19 07:34


 


BP  111/79   19 07:34


 


Pulse Ox  98   19 07:34














- Re-Assessments/Exams


Free Text/Narrative Re-Assessment/Exam: 





19 08:22


Discussed with Dr. Walsh. Given no fever here, well-appearing, no physical 

exam or historical findings suggestive of infection, will discharge home. 

Discussed strict ED return precautions for development of any symptoms that 

could be suggestive of infection.





Departure





- Departure


Time of Disposition: 08:20


Disposition: Home, Self-Care 01


Clinical Impression: 


 Postpartum fever








- Discharge Information


Referrals: 


Heather Chung MD [Primary Care Provider] - 


Forms:  ED Department Discharge


Additional Instructions: 


1. Drink plenty of fluids


2. OK to take another dose of Tylenol if fever returns


3. If you have another fever, follow-up with OB in clinic tomorrow


4. If you develop any new concerning symptoms, such as severe pain, cough/

difficulty breathing, chest pain, or worsening pain with urination or blood in 

the urine, return to the ED for a recheck